# Patient Record
Sex: MALE | Race: WHITE
[De-identification: names, ages, dates, MRNs, and addresses within clinical notes are randomized per-mention and may not be internally consistent; named-entity substitution may affect disease eponyms.]

---

## 2019-03-04 ENCOUNTER — HOSPITAL ENCOUNTER (INPATIENT)
Dept: HOSPITAL 61 - ER | Age: 64
LOS: 2 days | Discharge: HOME | DRG: 176 | End: 2019-03-06
Attending: FAMILY MEDICINE | Admitting: FAMILY MEDICINE
Payer: COMMERCIAL

## 2019-03-04 VITALS — DIASTOLIC BLOOD PRESSURE: 77 MMHG | SYSTOLIC BLOOD PRESSURE: 180 MMHG

## 2019-03-04 VITALS — WEIGHT: 266.38 LBS | BODY MASS INDEX: 30.82 KG/M2 | HEIGHT: 78 IN

## 2019-03-04 VITALS — SYSTOLIC BLOOD PRESSURE: 131 MMHG | DIASTOLIC BLOOD PRESSURE: 85 MMHG

## 2019-03-04 DIAGNOSIS — R79.1: ICD-10-CM

## 2019-03-04 DIAGNOSIS — Z79.01: ICD-10-CM

## 2019-03-04 DIAGNOSIS — Z86.718: ICD-10-CM

## 2019-03-04 DIAGNOSIS — E03.9: ICD-10-CM

## 2019-03-04 DIAGNOSIS — I48.91: ICD-10-CM

## 2019-03-04 DIAGNOSIS — M19.90: ICD-10-CM

## 2019-03-04 DIAGNOSIS — I26.99: Primary | ICD-10-CM

## 2019-03-04 DIAGNOSIS — I48.92: ICD-10-CM

## 2019-03-04 DIAGNOSIS — N40.0: ICD-10-CM

## 2019-03-04 DIAGNOSIS — E11.9: ICD-10-CM

## 2019-03-04 DIAGNOSIS — Z87.891: ICD-10-CM

## 2019-03-04 DIAGNOSIS — Z82.49: ICD-10-CM

## 2019-03-04 DIAGNOSIS — I10: ICD-10-CM

## 2019-03-04 DIAGNOSIS — E78.5: ICD-10-CM

## 2019-03-04 DIAGNOSIS — Z90.49: ICD-10-CM

## 2019-03-04 LAB
ALBUMIN SERPL-MCNC: 3.8 G/DL (ref 3.4–5)
ALBUMIN/GLOB SERPL: 1 {RATIO} (ref 1–1.7)
ALP SERPL-CCNC: 49 U/L (ref 46–116)
ALT SERPL-CCNC: 46 U/L (ref 16–63)
ANION GAP SERPL CALC-SCNC: 10 MMOL/L (ref 6–14)
AST SERPL-CCNC: 28 U/L (ref 15–37)
BASOPHILS # BLD AUTO: 0 X10^3/UL (ref 0–0.2)
BASOPHILS NFR BLD: 1 % (ref 0–3)
BILIRUB SERPL-MCNC: 0.8 MG/DL (ref 0.2–1)
BUN SERPL-MCNC: 13 MG/DL (ref 8–26)
BUN/CREAT SERPL: 16 (ref 6–20)
CALCIUM SERPL-MCNC: 9.3 MG/DL (ref 8.5–10.1)
CHLORIDE SERPL-SCNC: 107 MMOL/L (ref 98–107)
CK SERPL-CCNC: 60 U/L (ref 39–308)
CO2 SERPL-SCNC: 30 MMOL/L (ref 21–32)
CREAT SERPL-MCNC: 0.8 MG/DL (ref 0.7–1.3)
D DIMER PPP FEU-MCNC: 0.8 UG/MLFEU (ref 0–0.5)
EOSINOPHIL NFR BLD: 0.1 X10^3/UL (ref 0–0.7)
EOSINOPHIL NFR BLD: 1 % (ref 0–3)
ERYTHROCYTE [DISTWIDTH] IN BLOOD BY AUTOMATED COUNT: 14.5 % (ref 11.5–14.5)
GFR SERPLBLD BASED ON 1.73 SQ M-ARVRAT: 97.3 ML/MIN
GLOBULIN SER-MCNC: 3.8 G/DL (ref 2.2–3.8)
GLUCOSE SERPL-MCNC: 120 MG/DL (ref 70–99)
HCT VFR BLD CALC: 42.1 % (ref 39–53)
HGB BLD-MCNC: 14.2 G/DL (ref 13–17.5)
LIPASE: 100 U/L (ref 73–393)
LYMPHOCYTES # BLD: 1.3 X10^3/UL (ref 1–4.8)
LYMPHOCYTES NFR BLD AUTO: 25 % (ref 24–48)
MAGNESIUM SERPL-MCNC: 2 MG/DL (ref 1.8–2.4)
MCH RBC QN AUTO: 32 PG (ref 25–35)
MCHC RBC AUTO-ENTMCNC: 34 G/DL (ref 31–37)
MCV RBC AUTO: 94 FL (ref 79–100)
MONO #: 0.4 X10^3/UL (ref 0–1.1)
MONOCYTES NFR BLD: 8 % (ref 0–9)
NEUT #: 3.5 X10^3UL (ref 1.8–7.7)
NEUTROPHILS NFR BLD AUTO: 66 % (ref 31–73)
PLATELET # BLD AUTO: 239 X10^3/UL (ref 140–400)
POTASSIUM SERPL-SCNC: 4.2 MMOL/L (ref 3.5–5.1)
PROT SERPL-MCNC: 7.6 G/DL (ref 6.4–8.2)
PROTHROMBIN TIME: 13.5 SEC (ref 11.7–14)
RBC # BLD AUTO: 4.46 X10^6/UL (ref 4.3–5.7)
SODIUM SERPL-SCNC: 147 MMOL/L (ref 136–145)
WBC # BLD AUTO: 5.4 X10^3/UL (ref 4–11)

## 2019-03-04 PROCEDURE — 93970 EXTREMITY STUDY: CPT

## 2019-03-04 PROCEDURE — 82550 ASSAY OF CK (CPK): CPT

## 2019-03-04 PROCEDURE — 84484 ASSAY OF TROPONIN QUANT: CPT

## 2019-03-04 PROCEDURE — 85379 FIBRIN DEGRADATION QUANT: CPT

## 2019-03-04 PROCEDURE — 80053 COMPREHEN METABOLIC PANEL: CPT

## 2019-03-04 PROCEDURE — 83880 ASSAY OF NATRIURETIC PEPTIDE: CPT

## 2019-03-04 PROCEDURE — 85025 COMPLETE CBC W/AUTO DIFF WBC: CPT

## 2019-03-04 PROCEDURE — 93306 TTE W/DOPPLER COMPLETE: CPT

## 2019-03-04 PROCEDURE — 85520 HEPARIN ASSAY: CPT

## 2019-03-04 PROCEDURE — 85610 PROTHROMBIN TIME: CPT

## 2019-03-04 PROCEDURE — 85027 COMPLETE CBC AUTOMATED: CPT

## 2019-03-04 PROCEDURE — 71275 CT ANGIOGRAPHY CHEST: CPT

## 2019-03-04 PROCEDURE — 84443 ASSAY THYROID STIM HORMONE: CPT

## 2019-03-04 PROCEDURE — 93005 ELECTROCARDIOGRAM TRACING: CPT

## 2019-03-04 PROCEDURE — 36415 COLL VENOUS BLD VENIPUNCTURE: CPT

## 2019-03-04 PROCEDURE — 71045 X-RAY EXAM CHEST 1 VIEW: CPT

## 2019-03-04 PROCEDURE — 83735 ASSAY OF MAGNESIUM: CPT

## 2019-03-04 PROCEDURE — 83690 ASSAY OF LIPASE: CPT

## 2019-03-04 PROCEDURE — 96374 THER/PROPH/DIAG INJ IV PUSH: CPT

## 2019-03-04 RX ADMIN — SIMVASTATIN SCH MG: 40 TABLET, FILM COATED ORAL at 21:40

## 2019-03-04 RX ADMIN — HEPARIN SODIUM AND DEXTROSE PRN MLS/HR: 5000; 5 INJECTION INTRAVENOUS at 19:15

## 2019-03-04 RX ADMIN — AMOXICILLIN SCH MG: 250 CAPSULE ORAL at 21:40

## 2019-03-04 NOTE — PHYS DOC
Past Medical History


Past Medical History:  Diabetes-Type II, Hypertension, Other


Additional Past Medical Histor:  BPH


Past Surgical History:  Appendectomy, Other


Additional Past Surgical Histo:  cataract surgery, hernia repair


Additional Information:  


quit smoking cigarettes 6 years ago, currently "vapes"


Alcohol Use:  Occasionally


Drug Use:  None





Adult General


Chief Complaint


Chief Complaint:  CHEST PAIN





HPI


HPI





Patient is a 64  year old male who presents with complaining of abnormal EKG up 

to not primary care physician office today. Patient states he had a few 

episodes of substernal aching chest pain shortness of breath without the 

palpitation, radiation of pain, nausea, dizziness and generalized weakness, 

cough and fever and chills for the last 1 week and seen by his primary care 

physician as his routine appointment today and had abnormal EKG and sent to ER. 

Primary care physician reported that patient had new-onset of atrial flutter 

patient with rate of 60s with 3 seconds pause in his EKG. And had this same 

immobilization. He'll restrict airplane problem on  and complaining 

of mild discomfort feeling in left leg. Patient had history of hypertension, 

dyslipidemia, diabetes mellitus and family history of coronary artery disease 

and denies history of COPD and previous arrhythmia.





Review of Systems


Review of Systems





Constitutional: Denies fever or chills []


Eyes: Denies change in visual acuity, redness, or eye pain []


HENT: Denies nasal congestion or sore throat []


Respiratory: Denies cough, reports shortness of breath []


Cardiovascular: No additional information not addressed in HPI []


GI: Denies abdominal pain, nausea, vomiting, bloody stools or diarrhea []


: Denies dysuria or hematuria []


Musculoskeletal: Denies back pain or joint pain []


Integument: Denies rash or skin lesions []


Neurologic: Denies headache, focal weakness or sensory changes []


Endocrine: Denies polyuria or polydipsia []





All other systems were reviewed and found to be within normal limits, except as 

documented in this note.





Current Medications


Current Medications





Current Medications








 Medications


  (Trade)  Dose


 Ordered  Sig/Manny  Start Time


 Stop Time Status Last Admin


Dose Admin


 


 Aspirin


  (Children'S


 Aspirin)  324 mg  1X  ONCE  3/4/19 16:15


 3/4/19 16:53 DC 3/4/19 17:27


243 MG


 


 Info


  (CONTRAST GIVEN


 -- Rx MONITORING)  1 each  PRN DAILY  PRN  3/4/19 18:30


 3/6/19 18:29   





 


 Iohexol


  (Omnipaque 350


 Mg/ml)  100 ml  1X  ONCE  3/4/19 18:30


 3/4/19 18:31 DC 3/4/19 18:24


100 ML











Allergies


Allergies





Allergies








Coded Allergies Type Severity Reaction Last Updated Verified


 


  Penicillins Allergy Intermediate  3/4/19 Yes











Physical Exam


Physical Exam





Constitutional: Well developed, well nourished, no acute distress, non-toxic 

appearance. []


HENT: Normocephalic, atraumatic


Eyes: PERRLA, EOMI, conjunctiva normal, no discharge. [] 


Neck: Normal range of motion, no tenderness, supple, no stridor. [] 


Cardiovascular: Irregularly irregular rhythm without tachycardia, no murmur []


Lungs & Thorax:  Bilateral breath sounds clear to auscultation []


Abdomen: Bowel sounds normal, soft, no tenderness, no masses, no pulsatile 

masses. [] 


Skin: Warm, dry, no erythema, no rash. [] 


Back: No tenderness, no CVA tenderness. [] 


Extremities: No tenderness, no cyanosis, no clubbing, ROM intact, no edema. [] 


Neurologic: Alert and oriented X 3, normal motor function, normal sensory 

function, no focal deficits noted. []


Psychologic: Affect normal, judgement normal, mood normal. []





Current Patient Data


Vital Signs





 Vital Signs








  Date Time  Temp Pulse Resp B/P (MAP) Pulse Ox O2 Delivery O2 Flow Rate FiO2


 


3/4/19 15:58 97.7 66 20 155/74 (101) 96 Room Air  





 97.7       








Lab Values





 Laboratory Tests








Test


 3/4/19


17:17 3/4/19


17:45


 


White Blood Count


 5.4 x10^3/uL


(4.0-11.0) 





 


Red Blood Count


 4.46 x10^6/uL


(4.30-5.70) 





 


Hemoglobin


 14.2 g/dL


(13.0-17.5) 





 


Hematocrit


 42.1 %


(39.0-53.0) 





 


Mean Corpuscular Volume


 94 fL ()


 





 


Mean Corpuscular Hemoglobin 32 pg (25-35)   


 


Mean Corpuscular Hemoglobin


Concent 34 g/dL


(31-37) 





 


Red Cell Distribution Width


 14.5 %


(11.5-14.5) 





 


Platelet Count


 239 x10^3/uL


(140-400) 





 


Neutrophils (%) (Auto) 66 % (31-73)   


 


Lymphocytes (%) (Auto) 25 % (24-48)   


 


Monocytes (%) (Auto) 8 % (0-9)   


 


Eosinophils (%) (Auto) 1 % (0-3)   


 


Basophils (%) (Auto) 1 % (0-3)   


 


Neutrophils # (Auto)


 3.5 x10^3uL


(1.8-7.7) 





 


Lymphocytes # (Auto)


 1.3 x10^3/uL


(1.0-4.8) 





 


Monocytes # (Auto)


 0.4 x10^3/uL


(0.0-1.1) 





 


Eosinophils # (Auto)


 0.1 x10^3/uL


(0.0-0.7) 





 


Basophils # (Auto)


 0.0 x10^3/uL


(0.0-0.2) 





 


Sodium Level


 147 mmol/L


(136-145)  H 





 


Potassium Level


 4.2 mmol/L


(3.5-5.1) 





 


Chloride Level


 107 mmol/L


() 





 


Carbon Dioxide Level


 30 mmol/L


(21-32) 





 


Anion Gap 10 (6-14)   


 


Blood Urea Nitrogen


 13 mg/dL


(8-26) 





 


Creatinine


 0.8 mg/dL


(0.7-1.3) 





 


Estimated GFR


(Cockcroft-Gault) 97.3  


 





 


BUN/Creatinine Ratio 16 (6-20)   


 


Glucose Level


 120 mg/dL


(70-99)  H 





 


Calcium Level


 9.3 mg/dL


(8.5-10.1) 





 


Magnesium Level


 2.0 mg/dL


(1.8-2.4) 





 


Total Bilirubin


 0.8 mg/dL


(0.2-1.0) 





 


Aspartate Amino Transferase


(AST) 28 U/L (15-37)


 





 


Alanine Aminotransferase (ALT)


 46 U/L (16-63)


 





 


Alkaline Phosphatase


 49 U/L


() 





 


Creatine Kinase


 60 U/L


() 





 


Troponin I Quantitative


 < 0.017 ng/mL


(0.000-0.055) 





 


NT-Pro-B-Type Natriuretic


Peptide 706 pg/mL


(0-124)  H 





 


Total Protein


 7.6 g/dL


(6.4-8.2) 





 


Albumin


 3.8 g/dL


(3.4-5.0) 





 


Albumin/Globulin Ratio 1.0 (1.0-1.7)   


 


Lipase


 100 U/L


() 





 


Prothrombin Time


 


 13.5 SEC


(11.7-14.0)


 


Prothrombin Time INR  1.1 (0.8-1.1)  


 


D-Dimer (Rosalva)


 


 0.80 ug/mlFEU


(0.00-0.50)  H





 Laboratory Tests


3/4/19 17:17








 Laboratory Tests


3/4/19 17:17














EKG


EKG


EKG interpreted by me. EKG at 1559 showed atrial fibrillation at rate of 62, 

nonspecific intraventricular block, poor R-wave progress in anteroseptal leads, 

no acute ST and T-wave abnormalities.





Radiology/Procedures


Radiology/Procedures


[facility]


 [address] [place] [address]


 [phone]


 


 IMAGING REPORT





 Signed





PATIENT: ANGELIA KAM ACCOUNT: [id number] MRN#: [id number]


: 1955 LOCATION: ER AGE: 64


SEX: M EXAM DT: 19 ACCESSION#: [id number]


STATUS: PRE ER ORD. PHYSICIAN: NATALIA COLLIER MD 


REASON: chest pain


PROCEDURE: PORTABLE CHEST 1V





EXAM: Chest, 2 views.


 


HISTORY: Chest pain. Smoking history.


 


COMPARISON: None.


 


FINDINGS: 2 views of the chest are obtained. There are coarse likely 


chronic interstitial markings within both lungs. There is no 


consolidation, pleural effusion or pneumothorax. There is suspected 


bilateral basilar atelectasis or scarring. The heart is normal in size.


 


IMPRESSION: Chronic appearing coarse interstitial markings with suspected 


basilar atelectasis or scarring. No acute pulmonary finding.


 


Electronically signed by: Malinda Rodriguez MD (3/4/2019 4:32 PM) [place] [id number]














DICTATED and SIGNED BY:     MALINDA RODRIGUEZ MD


DATE:     19 1631





Course & Med Decision Making


Course & Med Decision Making


Pertinent Labs and Imaging studies reviewed. (See chart for details)





Evaluation of patient in ER showed 64-year-old male patient with episodes of 

chest pain or shortness of breath for one week and is sent immobilization. EKG 

showed atrial fibrillation without acute ST and T-wave abnormalities. Patient 

had d-dimer of 0.8 and CT of chest for PE is pending. Dr. Vizcarra accepted 

admission at 1737.





Dragon Disclaimer


Dragon Disclaimer


This electronic medical record was generated, in whole or in part, using a 

voice recognition dictation system.





Departure


Departure


Impression:  


 Primary Impression:  


 Acute chest pain


 Additional Impressions:  


 New onset atrial fibrillation


 Elevated d-dimer


Disposition:   ADMITTED AS INPATIENT (at 1737)


Admitting Physician:  Maria Guadalupe Vizcarra (accepted admission at 1737)


Condition:  STABLE


Referrals:  


MARIA GUADALUPE VIZCARRA MD (PCP)





Problem Qualifiers











NATALIA COLLIER MD Mar 4, 2019 17:55

## 2019-03-04 NOTE — RAD
Chest CTA

 

History: Chest pain, shortness of breath

 

Technique: After bolus of intravenous contrast, CT imaging was performed 

of the chest. Multiplanar reconstruction images to include MIP 

reconstruction images are submitted.  Exposure: One or more of the 

following individualized dose reduction techniques were utilized for this 

examination:  1. Automated exposure control  2. Adjustment of the mA 

and/or kV according to patient size  3. Use of iterative reconstruction 

technique.

 

Comparison: None

 

Findings: [ ] There is a more focal pulmonary embolism right lower lobe, 

also some likely tiny foci more peripherally. There are also right upper 

lobe pulmonary emboli extending to the right main pulmonary artery. There 

are likely some tiny peripheral left lower lobe pulmonary emboli.

 

There is very small right pleural effusion. There is no pneumothorax. 

There is reflux of contrast into the hepatic veins. There is some coronary

calcification. Thoracic aortic caliber is within normal limits without 

intraluminal flap. There is right hilar node about 1.3 cm short axis 

dimension. There is small 0.4 cm right upper lobe nodule axial image 35 

series 3.  There is hepatic steatosis.

 

Impression:

1. There are pulmonary emboli greater on the right. There is very small 

right pleural effusion.

2. There is hepatic steatosis.

3. There is coronary calcification.

4. There is nonspecific enlarged right hilar node.

5. There is reflux of contrast into the hepatic veins which is 

nonspecific, can be associated with heart failure although the heart is 

not enlarged.

 

 

**********FOR INTERNAL CODING PURPOSES**********

 

Critical result:

 

Findings discussed with  MARIA GUADALUPE GOTTI at 3/4/2019 6:50 PM.

 

RESULT CODE: (C)  

 

 

 

 

 

Electronically signed by: Evgeny Carranza MD (3/4/2019 6:59 PM) [facility]

## 2019-03-04 NOTE — EKG
[facility]

              [address] [place] [address]

Test Date:    2019               Test Time:    15:59:21

Pat Name:     ANGLEIA KAM          Department:   

Patient ID:   [id number]           Room:          

Gender:       M                        Technician:   

:          1955               Requested By: NATALIA COLLIER

Order Number: [id number]           Reading MD:   Luis Monsalve MD

                                 Measurements

Intervals                              Axis          

Rate:         62                       P:            

MI:                                    QRS:          60

QRSD:         126                      T:            -2

QT:           438                                    

QTc:          447                                    

                           Interpretive Statements

PROBABLE ATRIAL FIBRILLATION

NON-SPECIFIC ST/T CHANGES

Electronically Signed On 3-5-2019 7:07:17 CST by Luis Monsalve MD

## 2019-03-04 NOTE — RAD
EXAM: Chest, 2 views.

 

HISTORY: Chest pain. Smoking history.

 

COMPARISON: None.

 

FINDINGS: 2 views of the chest are obtained. There are coarse likely 

chronic interstitial markings within both lungs. There is no 

consolidation, pleural effusion or pneumothorax. There is suspected 

bilateral basilar atelectasis or scarring. The heart is normal in size.

 

IMPRESSION: Chronic appearing coarse interstitial markings with suspected 

basilar atelectasis or scarring. No acute pulmonary finding.

 

Electronically signed by: Malinda Kingston MD (3/4/2019 4:32 PM) [place] [id number]

## 2019-03-05 VITALS — SYSTOLIC BLOOD PRESSURE: 122 MMHG | DIASTOLIC BLOOD PRESSURE: 58 MMHG

## 2019-03-05 VITALS — SYSTOLIC BLOOD PRESSURE: 141 MMHG | DIASTOLIC BLOOD PRESSURE: 67 MMHG

## 2019-03-05 VITALS — DIASTOLIC BLOOD PRESSURE: 57 MMHG | SYSTOLIC BLOOD PRESSURE: 127 MMHG

## 2019-03-05 VITALS — SYSTOLIC BLOOD PRESSURE: 125 MMHG | DIASTOLIC BLOOD PRESSURE: 56 MMHG

## 2019-03-05 VITALS — DIASTOLIC BLOOD PRESSURE: 57 MMHG | SYSTOLIC BLOOD PRESSURE: 128 MMHG

## 2019-03-05 RX ADMIN — OXYBUTYNIN CHLORIDE SCH MG: 5 TABLET ORAL at 10:46

## 2019-03-05 RX ADMIN — PIOGLITAZONE SCH MG: 15 TABLET ORAL at 10:46

## 2019-03-05 RX ADMIN — SIMVASTATIN SCH MG: 40 TABLET, FILM COATED ORAL at 20:45

## 2019-03-05 RX ADMIN — AMOXICILLIN SCH MG: 250 CAPSULE ORAL at 14:18

## 2019-03-05 RX ADMIN — LISINOPRIL SCH MG: 5 TABLET ORAL at 10:46

## 2019-03-05 RX ADMIN — HEPARIN SODIUM AND DEXTROSE PRN MLS/HR: 5000; 5 INJECTION INTRAVENOUS at 23:17

## 2019-03-05 RX ADMIN — AMOXICILLIN SCH MG: 250 CAPSULE ORAL at 10:46

## 2019-03-05 RX ADMIN — HEPARIN SODIUM AND DEXTROSE PRN MLS/HR: 5000; 5 INJECTION INTRAVENOUS at 09:17

## 2019-03-05 NOTE — PDOC2
CARDIAC CONSULT


DATE OF CONSULT


Date of Consult


DATE: 3/5/19 


TIME: 10:22





REASON FOR CONSULT


Reason for Consult:


AFIB





REFERRING PHYSICIAN


Referring Physician:


Zackery





SOURCE


Source:  Chart review, Patient





HISTORY OF PRESENT ILLNESS


HISTORY OF PRESENT ILLNESS


This is a pleasant 63 yo male admitted for complains of chest pain and SOA.  

Reports that he was having this about a week ago just after arriving from 

[place] from a vacation. Coming back he noted that his LLE was sweollen and 

tender which got better.  He woke one morning and 2 consecutive mornings he had 

midchest tightness and SOA likely he could not catch his breath both 2 times 

lasting about 15 minutes which have not recurred.  He has had LLE DVT about 20 

yrs ago and his father also was on lifelong anticoagulation due to PE.  He has 

not been tested for clotting disorders and not known on his father.  He was 

then noted with PE.  No reported hx of AFIB but no symptoms as well.  No 

palpitations , dizziness, passing out.  Denies any recent surgeries, MVA and no 

cancer.  Denies any nausea diaphoresis and no recent infection.  He takes meds 

for HTN, DM and no BB nor CCB in his regimen.  No recreational drugs but has 

been vaping for about 5 yrs since quitting smoking at 2ppd for about 30 yrs. No 

prior hx of CAD, CVA or any arrhythmias.





PAST MEDICAL HISTORY


Cardiovascular:  HTN, Hyperlipidemia


Pulmonary:  No pertinent hx


CENTRAL NERVOUS SYSTEM:  Other (No pertinent history)


GI:  No pertinent hx


Heme/Onc:  Other (LE DVT)


Psych:  No pertinent hx


Musculoskeletal:  Osteoarthritis


Rheumatologic:  No pertinent hx


Infectious disease:  No pertinent hx


ENT:  No pertinent hx


Renal/:  No pertinent hx


Endocrine:  Diabetes (2)


Dermatology:  No pertinent hx





PAST SURGICAL HISTORY


Past Surgical History:  Appendectomy, Cataract Removal, Hernia Repair (left 

groin and umbilical)





FAMILY HISTORY


Family History:  Other (father with PE)





SOCIAL HISTORY


Smoke:  <1 pack per day (vaping started 5 yrs ago, otherwise 60 pk yr)


ALCOHOL:  none


Drugs:  None


Lives:  with Family





CURRENT MEDICATIONS


CURRENT MEDICATIONS





Current Medications








 Medications


  (Trade)  Dose


 Ordered  Sig/Manny


 Route


 PRN Reason  Start Time


 Stop Time Status Last Admin


Dose Admin


 


 Aspirin


  (Children'S


 Aspirin)  324 mg  1X  ONCE


 PO


   3/4/19 16:15


 3/4/19 16:53 DC 3/4/19 17:27





 


 Iohexol


  (Omnipaque 350


 Mg/ml)  100 ml  1X  ONCE


 IV


   3/4/19 18:30


 3/4/19 18:31 DC 3/4/19 18:24





 


 Heparin Sodium


  (Porcine)


  (Heparin Sodium)  9,850 unit  1X  ONCE


 IV


   3/4/19 19:00


 3/4/19 19:01 DC 3/4/19 19:13





 


 Heparin Sodium/


 Dextrose  500 ml @ 0


 mls/hr  CONT  PRN


 IV


 SEE I/O RECORD  3/4/19 19:00


    3/5/19 09:17





 


 Amoxicillin


  (Amoxil)  250 mg  TID


 PO


   3/4/19 22:00


 3/5/19 14:01  3/4/19 21:40





 


 Metronidazole


  (Flagyl)  250 mg  Q8HRS


 PO


   3/4/19 22:00


 3/5/19 14:01  3/5/19 05:49





 


 Simvastatin


  (Zocor)  40 mg  QHS


 PO


   3/4/19 22:00


    3/4/19 21:40














ALLERGIES


ALLERGIES:  


Coded Allergies:  


     Penicillins (Verified  Allergy, Intermediate, 3/4/19)





ROS


Review of System


14 point ROS evaluated with pertinent positives noted per HPI





PHYSICAL EXAM


General:  Alert, Oriented X3, Cooperative, No acute distress


HEENT:  Atraumatic, Mucous membr. moist/pink


Lungs:  Clear to auscultation, Normal air movement


Heart:  Other (AFIB)


Abdomen:  Soft, No tenderness


Extremities:  No cyanosis, No edema


Skin:  No breakdown, No significant lesion


Neuro:  Normal speech, Sensation intact


Psych/Mental Status:  Mental status NL, Mood NL


MUSCULOSKELETAL:  Osteoarthritic changes both hands





VITALS


VITALS





Vital Signs








  Date Time  Temp Pulse Resp B/P (MAP) Pulse Ox O2 Delivery O2 Flow Rate FiO2


 


3/5/19 08:00      Room Air  


 


3/5/19 07:00 97.7 48 18 122/58 (79) 98   





 97.7       











LABS


Lab:





Laboratory Tests








Test


 3/4/19


17:17 3/4/19


17:45 3/4/19


20:50 3/5/19


01:00


 


White Blood Count


 5.4 x10^3/uL


(4.0-11.0) 


 


 





 


Red Blood Count


 4.46 x10^6/uL


(4.30-5.70) 


 


 





 


Hemoglobin


 14.2 g/dL


(13.0-17.5) 


 


 





 


Hematocrit


 42.1 %


(39.0-53.0) 


 


 





 


Mean Corpuscular Volume 94 fL ()    


 


Mean Corpuscular Hemoglobin 32 pg (25-35)    


 


Mean Corpuscular Hemoglobin


Concent 34 g/dL


(31-37) 


 


 





 


Red Cell Distribution Width


 14.5 %


(11.5-14.5) 


 


 





 


Platelet Count


 239 x10^3/uL


(140-400) 


 


 





 


Neutrophils (%) (Auto) 66 % (31-73)    


 


Lymphocytes (%) (Auto) 25 % (24-48)    


 


Monocytes (%) (Auto) 8 % (0-9)    


 


Eosinophils (%) (Auto) 1 % (0-3)    


 


Basophils (%) (Auto) 1 % (0-3)    


 


Neutrophils # (Auto)


 3.5 x10^3uL


(1.8-7.7) 


 


 





 


Lymphocytes # (Auto)


 1.3 x10^3/uL


(1.0-4.8) 


 


 





 


Monocytes # (Auto)


 0.4 x10^3/uL


(0.0-1.1) 


 


 





 


Eosinophils # (Auto)


 0.1 x10^3/uL


(0.0-0.7) 


 


 





 


Basophils # (Auto)


 0.0 x10^3/uL


(0.0-0.2) 


 


 





 


Sodium Level


 147 mmol/L


(136-145) 


 


 





 


Potassium Level


 4.2 mmol/L


(3.5-5.1) 


 


 





 


Chloride Level


 107 mmol/L


() 


 


 





 


Carbon Dioxide Level


 30 mmol/L


(21-32) 


 


 





 


Anion Gap 10 (6-14)    


 


Blood Urea Nitrogen


 13 mg/dL


(8-26) 


 


 





 


Creatinine


 0.8 mg/dL


(0.7-1.3) 


 


 





 


Estimated GFR


(Cockcroft-Gault) 97.3 


 


 


 





 


BUN/Creatinine Ratio 16 (6-20)    


 


Glucose Level


 120 mg/dL


(70-99) 


 


 





 


Calcium Level


 9.3 mg/dL


(8.5-10.1) 


 


 





 


Magnesium Level


 2.0 mg/dL


(1.8-2.4) 


 


 





 


Total Bilirubin


 0.8 mg/dL


(0.2-1.0) 


 


 





 


Aspartate Amino Transf


(AST/SGOT) 28 U/L (15-37) 


 


 


 





 


Alanine Aminotransferase


(ALT/SGPT) 46 U/L (16-63) 


 


 


 





 


Alkaline Phosphatase


 49 U/L


() 


 


 





 


Creatine Kinase


 60 U/L


() 


 


 





 


Troponin I Quantitative


 < 0.017 ng/mL


(0.000-0.055) 


 < 0.017 ng/mL


(0.000-0.055) < 0.017 ng/mL


(0.000-0.055)


 


NT-Pro-B-Type Natriuretic


Peptide 706 pg/mL


(0-124) 


 


 





 


Total Protein


 7.6 g/dL


(6.4-8.2) 


 


 





 


Albumin


 3.8 g/dL


(3.4-5.0) 


 


 





 


Albumin/Globulin Ratio 1.0 (1.0-1.7)    


 


Lipase


 100 U/L


() 


 


 





 


Prothrombin Time


 


 13.5 SEC


(11.7-14.0) 


 





 


Prothromb Time International


Ratio 


 1.1 (0.8-1.1) 


 


 





 


D-Dimer (Rosalva)


 


 0.80 ug/mlFEU


(0.00-0.50) 


 





 


Heparin Anti-Xa Act,


Unfractionated 


 


 


 0.79 IU/mL


(0.30-0.70)


 


Thyroid Stimulating Hormone


(TSH) 


 


 


 3.769 uIU/mL


(0.358-3.74)


 


Test


 3/5/19


07:17 


 


 





 


Heparin Anti-Xa Act,


Unfractionated 0.59 IU/mL


(0.30-0.70) 


 


 














ASSESSMENT/PLAN


ASSESSMENT/PLAN


1. Bilateral acute PE: per PCP


2. Slow AFIB and asymptomatic: likely induced by PE. Lowest at 40 no blocks or 

pauses. 


3. HTN: controlled


4. DM2/HLP


5. Hx of LE DVT and Father had hx of PE and life long anticoagulation


6. LE varicosities: oprevious unilateral LLE swelling, possible from PE but 

sono revealed none so far. 


7. 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  Unknown vaping 

ingredients. 





Recommendations


1. will need coag studies, defer to PCP.  Anticoagulation therapy per PCP


2. TTE today.  Avoid AV omer blocking agents.  Will plan for MCOT


3. If AFIB persist as an outpt and depending on the result of MCOT then will 

consider outpt cardioversion











GARY DEXTER Mar 5, 2019 10:34

## 2019-03-05 NOTE — RAD
Bilateral lower extremity venous duplex study 3/5/2019 8:23 AM

 

Clinical History: Acute pulmonary embolus. Evaluate for thrombus or cyst.

 

Comparison: None available

 

Technique: Using a combination of real time ultrasound imaging and 

color-flow and pulse Doppler imaging techniques along with graded 

compression and augmentation, duplex evaluation of the deep venous system 

of the both lower extremities was performed. Multiple images were 

obtained.

 

Findings: There is no sonographic evidence of deep venous thrombosis 

involving the visualized deep venous structures of either lower extremity.

 

Impression: No evidence of deep venous thrombosis involving either lower 

extremity

 

Electronically signed by: Isidoro Pacheco MD (3/5/2019 9:14 AM) [id number]

## 2019-03-05 NOTE — CARD
MR#: [id number]

Account#: [id number]

Accession#: [id number]

Date of Study: 03/05/2019

Ordering Physician: MARIA GUADALUPE MACHUCA, 

Referring Physician: MARIA GUADALUPE MACHUCA 

Tech: Aminah Rodriguez RDCS





--------------- APPROVED REPORT --------------





EXAM: Two-dimensional and M-mode echocardiogram with Doppler and color Doppler.



Other Information 

Quality : Technically LimitedHR: 44bpm

Rhythm : BradycardiaTechnically limited study due to  body habitus.



INDICATION

Chest Pain 



2D DIMENSIONS 

RVDd4.0 (2.9-3.5cm)Left Atrium(2D)4.6 (1.6-4.0cm)

IVSd1.3 (0.7-1.1cm)Aortic Root(2D)3.6 (2.0-3.7cm)

LVDd5.8 (3.9-5.9cm)LVOT Diameter2.5 (1.8-2.4cm)

PWd1.1 (0.7-1.1cm)LVDs4.3 (2.5-4.0cm)

FS (%) 25.5 %SV81.7 ml

LVEF(%)50.0 (>50%)



M-Mode DIMENSIONS 

Left Atrium(MM)4.76 (2.5-4.0cm)Aortic Root3.65 (2.2-3.7cm)



Aortic Valve

AoV Peak Chance.113.7cm/sAoV VTI18.0cm

AO Peak GR.5.2mmHgLVOT Peak Chance.67.5cm/s

AO Mean GR.2mmHgAVA (VMAX)2.95cm2

MARY   (VTI)3.00cm2



Mitral Valve

MV E Fqqohing44.5cm/sMV DECEL [id number]

MV A Mkgstplp58.0cm/sE/A  Ratio3.7



Pulmonary Valve

PV Peak Bhlytadj990.3cm/s



Tricuspid Valve

TR P. Kafdvica395ik/sRAP [id number]

TR Peak Gr.[id number]



 LEFT VENTRICLE 

The left ventricle is normal size. There is mild concentric left ventricular hypertrophy. Left ventri
walker systolic function is normal. The Ejection Fraction is 55-60%. There is normal LV segmental wall m
otion.



 RIGHT VENTRICLE 

The right ventricle is mildly dilated. There is normal right ventricular wall thickness. Systolic fun
ction is borderline reduced.



 ATRIA 

The left atrium is mildly dilated. The right atrium is mildly dilated. The interatrial septum is inta
ct with no evidence for an atrial septal defect or patent foramen ovale as noted on 2-D or Doppler im
aging.



 AORTIC VALVE 

The non coronary cusp is calcified. The aortic valve is trileaflet. Doppler and Color Flow revealed n
o significant aortic regurgitation. There is no significant aortic valvular stenosis.



 MITRAL VALVE 

The mitral valve is normal in structure and function. There is no evidence of mitral valve prolapse. 
There is no mitral valve stenosis. Doppler and Color-flow revealed trace mitral regurgitation.



 TRICUSPID VALVE 

The tricuspid valve is normal in structure and function. Doppler and Color Flow revealed mild tricusp
id regurgitation. There is moderate pulmonary hypertension. The PA pressure was estimated at 42 mmHg.
 There is no tricuspid valve prolapse or vegetation. There is no tricuspid valve stenosis.



 PULMONIC VALVE 

The pulmonary valve is normal in structure and function. Doppler and Color Flow revealed mild pulmoni
c valvular regurgitation. There is no pulmonic valvular stenosis.



 GREAT VESSELS 

The aortic root is mildly enlarged. The ascending aorta is normal in size. The IVC is dilated and col
lapses >50% with inspiration.



 PERICARDIAL EFFUSION 

There is no evidence of significant pericardial effusion.



Critical Notification

Critical Value: No



<Conclusion>

Left ventricle systolic function is normal.

The Ejection Fraction is 55-60%.

There is normal LV segmental wall motion.

Trace mitral regurgitation.

Mild tricuspid regurgitation.

The PA pressure was estimated at 42 mmHg.

There is no evidence of significant pericardial effusion.



Signed by : Alfredo Perez, 

Electronically Approved : 03/05/2019 14:29:01

## 2019-03-05 NOTE — NUR
SS following for discharge planning. SS reviewed pt chart. Pt is from home with spouse and 
is currently on room air. No discharge needs noted at this time. SS will continue to follow 
for pending discharge needs.

## 2019-03-05 NOTE — HP
ADMIT DATE:  03/04/2019



CHIEF COMPLAINT:  Irregular heartbeat.



HISTORY OF PRESENT ILLNESS:  A 64-year-old white male is reasonably well

controlled diabetic, seen in the office on the day of admission for routine

followup.  He was not having shortness of breath, cough, chest pain, leg pain or

any other specific symptoms or any presyncopal symptoms.  In the office, he was

noted to have a mildly irregular heartbeat, which per EKG showed atrial

fibrillation with slow ventricular response at 2-3 second pauses at one time

during the tracing.  He came to the ER and evaluation included a D-dimer, which

was elevated, and CT scan was subsequently showed bilateral pulmonary emboli,

though he has not been symptomatic of this.  He had a recent plane ride 2 weeks

ago, but only a 3-hour flight.  He has never had any previous cardiovascular

problems.



PAST MEDICAL HISTORY:  Well-controlled type 2 diabetic, on 3 medications.



ALLERGIES:  PENICILLIN, taken antibiotics for dental work.



Now, he has had no cardiovascular events or significant surgeries in the past.



SOCIAL HISTORY:  Nonsmoker, light drinker, , employed. Owns the local

restaurant.



FAMILY HISTORY:  Unremarkable.



REVIEW OF SYSTEMS:  No other complaints.



OBJECTIVE:

ENT:  All within normal limits.

NECK:  No nodes, thyroid enlargement, bruits or masses.

LUNGS:  Clear.

CARDIOVASCULAR:  Irregular rate in the 50s.  No notable positives are present at

this time.

ABDOMEN:  Soft, benign and nontender.

EXTREMITIES:  Good pedal and radial pulses.  No edema.  No joint or skin lesions

or leg findings.

NEUROLOGIC:  Physiologic.



ASSESSMENT:

1.  Occult atrial fibrillation, slow ventricular response, etiology

undetermined.  There was a 3-second pause in the office present, but is not

symptomatic from this.

2.  Bilateral pulmonary emboli.  Relationship to the cardiac arrhythmias unclear

at this time.  Recent plane ride was present.

3.  Type 2 diabetes, reasonably well controlled.



PLAN:  Echo, TSH, heparin for now.  We will hold warfarin as he may need a

pacemaker if pauses reoccur.

 



______________________________

MARIA GUADALUPE MACHUCA MD



DR:  ANNIA/samanta  JOB#:  [id number] / [id number]

DD:  03/05/2019 08:28  DT:  03/05/2019 08:48

## 2019-03-06 VITALS — SYSTOLIC BLOOD PRESSURE: 149 MMHG | DIASTOLIC BLOOD PRESSURE: 79 MMHG

## 2019-03-06 VITALS — DIASTOLIC BLOOD PRESSURE: 63 MMHG | SYSTOLIC BLOOD PRESSURE: 112 MMHG

## 2019-03-06 VITALS — SYSTOLIC BLOOD PRESSURE: 111 MMHG | DIASTOLIC BLOOD PRESSURE: 57 MMHG

## 2019-03-06 LAB
ERYTHROCYTE [DISTWIDTH] IN BLOOD BY AUTOMATED COUNT: 14.5 % (ref 11.5–14.5)
HCT VFR BLD CALC: 38.4 % (ref 39–53)
HGB BLD-MCNC: 13 G/DL (ref 13–17.5)
MCH RBC QN AUTO: 32 PG (ref 25–35)
MCHC RBC AUTO-ENTMCNC: 34 G/DL (ref 31–37)
MCV RBC AUTO: 94 FL (ref 79–100)
PLATELET # BLD AUTO: 226 X10^3/UL (ref 140–400)
RBC # BLD AUTO: 4.07 X10^6/UL (ref 4.3–5.7)
WBC # BLD AUTO: 4.9 X10^3/UL (ref 4–11)

## 2019-03-06 RX ADMIN — OXYBUTYNIN CHLORIDE SCH MG: 5 TABLET ORAL at 08:29

## 2019-03-06 RX ADMIN — PIOGLITAZONE SCH MG: 15 TABLET ORAL at 08:29

## 2019-03-06 RX ADMIN — LISINOPRIL SCH MG: 5 TABLET ORAL at 08:30

## 2019-03-06 NOTE — NUR
Heart rate between 36-55 on the monitor. Cardiology called spoke with Priyanka, no new orders 
at this time. Will continue to closely monitor. 

-------------------------------------------------------------------------------

Addendum: 03/06/19 at 1054 by KYRA TUTTLE RN

-------------------------------------------------------------------------------

Patient ambulated around unit heart rate increased to 63

## 2019-03-06 NOTE — NUR
Discharge: 

Teaching verbal and written. Reviewed medications, follow-up, event monitor, bradycardia, 
atrial fibrillation, ect. Patient and wife verbalized understanding. Prescription for 
Xarelto called into pharmacy per Dr. Vizcarra. Outpatient event monitor put on by Priyanka. IV 
removed without complications, catheter tip in-tact. All belongings with patient. Patient 
assisted off of unit via wheelchair accompanied by CNA

## 2019-03-06 NOTE — PDOC
CARDIO Progress Notes


Date and Time


Date of Service


3/6/19


Time of Evaluation


1210





Subjective


Subjective:  No Chest Pain, No shortness of breath, No Palpitations





Vitals


Vitals





Vital Signs








  Date Time  Temp Pulse Resp B/P (MAP) Pulse Ox O2 Delivery O2 Flow Rate FiO2


 


3/6/19 11:00 97.9 39 18 112/63 (79) 97 Room Air  





 97.9       








Weight


Weight [ ]





Input and Output


Intake and Output











Intake and Output 


 


 3/6/19





 07:00


 


Intake Total 2280 ml


 


Output Total 1200 ml


 


Balance 1080 ml


 


 


 


Intake Oral 1780 ml


 


IV Total 500 ml


 


Output Urine Total 1200 ml


 


# Voids 5











Laboratory


Labs





Laboratory Tests








Test


 3/5/19


13:00 3/6/19


04:30


 


Heparin Anti-Xa Act,


Unfractionated 0.50 IU/mL


(0.30-0.70) 0.43 IU/mL


(0.30-0.70)


 


White Blood Count


 


 4.9 x10^3/uL


(4.0-11.0)


 


Red Blood Count


 


 4.07 x10^6/uL


(4.30-5.70)


 


Hemoglobin


 


 13.0 g/dL


(13.0-17.5)


 


Hematocrit


 


 38.4 %


(39.0-53.0)


 


Mean Corpuscular Volume  94 fL () 


 


Mean Corpuscular Hemoglobin  32 pg (25-35) 


 


Mean Corpuscular Hemoglobin


Concent 


 34 g/dL


(31-37)


 


Red Cell Distribution Width


 


 14.5 %


(11.5-14.5)


 


Platelet Count


 


 226 x10^3/uL


(140-400)











Physical Exam


HEENT:  Neck Supple W Full Motion


LUNGS:  Clear to Auscultation


Heart:  S1S2, irregularly irregular


Abdomen:  Soft N/T


Extremities:  No Calf Tenderness


Neurology:  alert, oriented, follow commands





Assessment


Assessment


1. Bilateral acute PE: on Xarelto. per PCP


2. Slow AFIB, (new) asymptomatic: likely induced by PE. Lowest at 40 no blocks 

or pauses. 


3. HTN: controlled


4. DM2/HLP


5. Hx of LE DVT and Father had hx of PE and life long anticoagulation








Recommendations





Avoid AV omer blocking agents.  MCOT x1 week. Placed on patient prior to DC- 

instructions provided


Continue NOAC


F/u in our office with Dr. Medley in 2 weeks as scheduled.











SHEYLA RIVERA Mar 6, 2019 12:28

## 2019-03-06 NOTE — PDOC
Provider Note


Provider Note


feels well- rate af 40-50s, no more pauses- echo fine, no ms- labs ok- will 

start xarelto and see if covered per insurance, otherwise lovenox/warf- leg 

sono clear- will do 6 months anticoag unless af persists, then hypercoag labs 

when off meds- consider op monitor re concern of bradyarrthymias but is 

asymptomatic











MARIA GUADALUPE MACHUCA MD Mar 6, 2019 08:02

## 2019-03-06 NOTE — DS
DATE OF DISCHARGE:  03/06/2019



HOSPITAL SUMMARY:  A 64-year-old white male was found to be in atrial

fibrillation in my office and came to the ER where he described some dyspnea and

chest pain at times.  Lower extremity Dopplers were negative for DVT, but he had

bilateral pulmonary emboli, right greater than left on CT angio.  All blood work

was unremarkable including troponins and TSH was borderline elevated at 4.3.  He

had a slow heart rate with his atrial fibrillation and no sinus pauses has had

been noted in the office.  Echocardiogram showed excellent ejection fraction of

60%, no sign of mitral stenosis, pericardial fluid or any lesions related to

atrial fibrillation.  He was treated with IV heparin initially and then Xarelto

was started and he will be discharged and followed as an outpatient at this

time.



FINAL DIAGNOSES:

1.  Bilateral pulmonary emboli.

2.  Atrial fibrillation with slow ventricular response, duration and definite

etiology undetermined.

3.  Subclinical hypothyroidism.



OPERATIONS, PROCEDURES, COMPLICATIONS:  None.



CONSULTATIONS:  Dr. Mcmillan's group.



DISPOSITION:  Xarelto 15 mg twice a day for 3 more weeks, then 20 mg once a day.

 We will treat him for 6 months unless he remains in atrial fibrillation. 

Etiology of atrial fibrillation is unknown, but may be related to recent

pulmonary emboli and may resolve over time as he has not been a problem before. 

Once he is off anticoagulants, we will do hypercoagulable workup as there is

apparently a family history in his father of similar problem of unknown

etiology.  Activity as tolerated.  Regular diet and same home medicines and

office followup with Dr. Vizcarra in 1 week.  Any presyncopal type events would

warrant an event monitor to rule out the possibility of significant

bradyarrhythmias and the need for possible pacemaker placement.

 



______________________________

MARIA GUADALUPE VIZCARRA MD



DR:  ANNIA/samanta  JOB#:  [id number] / [id number]

DD:  03/06/2019 12:49  DT:  03/06/2019 21:58